# Patient Record
Sex: MALE | Race: ASIAN | NOT HISPANIC OR LATINO
[De-identification: names, ages, dates, MRNs, and addresses within clinical notes are randomized per-mention and may not be internally consistent; named-entity substitution may affect disease eponyms.]

---

## 2022-02-23 PROBLEM — Z00.00 ENCOUNTER FOR PREVENTIVE HEALTH EXAMINATION: Status: ACTIVE | Noted: 2022-02-23

## 2022-02-28 ENCOUNTER — APPOINTMENT (OUTPATIENT)
Dept: UROLOGY | Facility: CLINIC | Age: 27
End: 2022-02-28
Payer: COMMERCIAL

## 2022-02-28 VITALS
DIASTOLIC BLOOD PRESSURE: 77 MMHG | BODY MASS INDEX: 20.86 KG/M2 | HEART RATE: 60 BPM | HEIGHT: 71 IN | RESPIRATION RATE: 16 BRPM | SYSTOLIC BLOOD PRESSURE: 111 MMHG | WEIGHT: 149 LBS | OXYGEN SATURATION: 97 % | TEMPERATURE: 98 F

## 2022-02-28 DIAGNOSIS — Z78.9 OTHER SPECIFIED HEALTH STATUS: ICD-10-CM

## 2022-02-28 PROCEDURE — 99204 OFFICE O/P NEW MOD 45 MIN: CPT

## 2022-02-28 NOTE — HISTORY OF PRESENT ILLNESS
[FreeTextEntry1] : Patient is a 25 yo M who presents for ED.\par \par He reports for past 1-2 months noticing decline in his erections.\par More difficulty maintaining an erection beyond a few minutes, last year could maintain for 10-20min.\par Worse in Jan last month, this month has been improving.\par \par Reports good sleep 8 hrs daily, stable stress at work.  no drugs/alcohol/smoking.\par \par Stable relationship with gf for past 1.5 yrs.\par \par Denies low libido or low energy.\par \par No scrotal/genital pain.

## 2022-02-28 NOTE — ASSESSMENT
[FreeTextEntry1] : Patient is a 27 yo M who presents for ED.\par \par Also L varicocele noted on exam. \par Will obtain scrotal sono to confirm varicocele and assess testis volume. Will d/w pt if any fertility concerns and SA in future\par Will obtain T level for ED, which does appear to be improving/resolving\par D/w pt that T level should be performed first AM draw due to fluctuations in level throughout the day\par F/u for ULT

## 2022-02-28 NOTE — PHYSICAL EXAM
[General Appearance - Well Developed] : well developed [General Appearance - Well Nourished] : well nourished [Normal Appearance] : normal appearance [Well Groomed] : well groomed [General Appearance - In No Acute Distress] : no acute distress [Edema] : no peripheral edema [Respiration, Rhythm And Depth] : normal respiratory rhythm and effort [Exaggerated Use Of Accessory Muscles For Inspiration] : no accessory muscle use [Abdomen Soft] : soft [Abdomen Tenderness] : non-tender [Costovertebral Angle Tenderness] : no ~M costovertebral angle tenderness [Urethral Meatus] : meatus normal [Urinary Bladder Findings] : the bladder was normal on palpation [Scrotum] : the scrotum was normal [Testes Mass (___cm)] : there were no testicular masses [FreeTextEntry1] : L grade 3 varicocele, no R varicocele. reduces when supine [Normal Station and Gait] : the gait and station were normal for the patient's age [] : no rash [No Focal Deficits] : no focal deficits [Oriented To Time, Place, And Person] : oriented to person, place, and time [Affect] : the affect was normal [Mood] : the mood was normal [Not Anxious] : not anxious

## 2022-03-07 ENCOUNTER — APPOINTMENT (OUTPATIENT)
Dept: UROLOGY | Facility: CLINIC | Age: 27
End: 2022-03-07
Payer: COMMERCIAL

## 2022-03-07 VITALS — TEMPERATURE: 98.5 F

## 2022-03-07 DIAGNOSIS — I86.1 SCROTAL VARICES: ICD-10-CM

## 2022-03-07 DIAGNOSIS — N52.9 MALE ERECTILE DYSFUNCTION, UNSPECIFIED: ICD-10-CM

## 2022-03-07 PROCEDURE — 76870 US EXAM SCROTUM: CPT

## 2022-03-07 PROCEDURE — 99213 OFFICE O/P EST LOW 20 MIN: CPT | Mod: 25

## 2022-03-07 NOTE — PHYSICAL EXAM
[General Appearance - Well Developed] : well developed [General Appearance - Well Nourished] : well nourished [Well Groomed] : well groomed [Normal Appearance] : normal appearance [General Appearance - In No Acute Distress] : no acute distress [] : no respiratory distress [Respiration, Rhythm And Depth] : normal respiratory rhythm and effort [Exaggerated Use Of Accessory Muscles For Inspiration] : no accessory muscle use [Oriented To Time, Place, And Person] : oriented to person, place, and time [Affect] : the affect was normal [Mood] : the mood was normal [Not Anxious] : not anxious

## 2022-03-07 NOTE — ASSESSMENT
[FreeTextEntry1] : Patient is a 27 yo M who presents for L varicocele.\par \par -will obtain T level and SA - again instructed pt to schedule SA\par -Will contact w results\par -F/u PRN

## 2022-03-07 NOTE — HISTORY OF PRESENT ILLNESS
[FreeTextEntry1] : Patient is a 27 yo M who presents for ED.\par \par He reports for past 1-2 months noticing decline in his erections.\par More difficulty maintaining an erection beyond a few minutes, last year could maintain for 10-20min.\par Worse in Jan last month, this month has been improving.\par \par Reports good sleep 8 hrs daily, stable stress at work.  no drugs/alcohol/smoking.\par \par Stable relationship with gf for past 1.5 yrs.\par \par Denies low libido or low energy.\par \par No scrotal/genital pain.\par \par Interval hx:\par Improved erections, essentially back to baseline.\par L varicocele confirmed on scrotal ULT\par Still pending SA and T level.

## 2022-04-01 LAB — TESTOST SERPL-MCNC: 838 NG/DL
